# Patient Record
Sex: MALE | Race: WHITE | Employment: FULL TIME | URBAN - METROPOLITAN AREA
[De-identification: names, ages, dates, MRNs, and addresses within clinical notes are randomized per-mention and may not be internally consistent; named-entity substitution may affect disease eponyms.]

---

## 2017-12-04 PROCEDURE — 83970 ASSAY OF PARATHORMONE: CPT | Performed by: INTERNAL MEDICINE

## 2017-12-04 PROCEDURE — 81003 URINALYSIS AUTO W/O SCOPE: CPT | Performed by: INTERNAL MEDICINE

## 2020-02-21 ENCOUNTER — OFFICE VISIT (OUTPATIENT)
Dept: FAMILY MEDICINE CLINIC | Facility: CLINIC | Age: 60
End: 2020-02-21
Payer: COMMERCIAL

## 2020-02-21 VITALS
OXYGEN SATURATION: 99 % | HEIGHT: 75 IN | BODY MASS INDEX: 33.45 KG/M2 | DIASTOLIC BLOOD PRESSURE: 88 MMHG | RESPIRATION RATE: 12 BRPM | HEART RATE: 66 BPM | TEMPERATURE: 98 F | WEIGHT: 269 LBS | SYSTOLIC BLOOD PRESSURE: 124 MMHG

## 2020-02-21 DIAGNOSIS — H60.332 ACUTE SWIMMER'S EAR OF LEFT SIDE: ICD-10-CM

## 2020-02-21 DIAGNOSIS — H65.92 LEFT NON-SUPPURATIVE OTITIS MEDIA: Primary | ICD-10-CM

## 2020-02-21 PROCEDURE — 99203 OFFICE O/P NEW LOW 30 MIN: CPT | Performed by: PHYSICIAN ASSISTANT

## 2020-02-21 RX ORDER — AMOXICILLIN 500 MG/1
1000 TABLET, FILM COATED ORAL 2 TIMES DAILY
Qty: 40 TABLET | Refills: 0 | Status: SHIPPED | OUTPATIENT
Start: 2020-02-21 | End: 2020-03-02

## 2020-02-21 NOTE — PATIENT INSTRUCTIONS
Continue Flonase   Tylenol as needed   Keep ear dry   Please follow up with PCP if no improvement or if symptoms worsen

## 2020-02-21 NOTE — PROGRESS NOTES
CHIEF COMPLAINT:   Patient presents with:  Ear Pain      HPI:   Kimberli Gu is a 60year old male who presents to clinic today with complaints of left ear pain for 2 days. Pain to the touch. Pain radiating to the jaw. No ear discharge.  No hearing c 57.9 (all LAD)   • Hyperparathyroidism ([address] 1/16/2015   • Obesity    • Visual impairment     glasses      Social History:  Social History    Tobacco Use      Smoking status: Never Smoker      Smokeless tobacco: Never Used    Alcohol use:  Yes      Alcohol/ amoxicillin 500 MG Oral Tab 40 tablet 0     Sig: Take 2 tablets (1,000 mg total) by mouth 2 (two) times daily for 10 days.    • Neomycin-Polymyxin-HC 3.5-09043-7 Otic Solution 1 Bottle 0     Sig: Place 4 drops into the left ear 4 (four) times daily for 7 da

## 2020-12-14 PROBLEM — R93.1 AGATSTON CORONARY ARTERY CALCIUM SCORE LESS THAN 100: Status: ACTIVE | Noted: 2020-12-14

## 2021-03-26 ENCOUNTER — IMMUNIZATION (OUTPATIENT)
Dept: LAB | Age: 61
End: 2021-03-26

## 2021-03-26 DIAGNOSIS — Z23 NEED FOR VACCINATION: Primary | ICD-10-CM

## 2021-03-26 PROCEDURE — 91300 COVID 19 PFIZER-BIONTECH: CPT

## 2021-03-26 PROCEDURE — 0001A COVID 19 PFIZER-BIONTECH: CPT

## 2021-04-17 ENCOUNTER — IMMUNIZATION (OUTPATIENT)
Dept: LAB | Age: 61
End: 2021-04-17
Attending: HOSPITALIST

## 2021-04-17 DIAGNOSIS — Z23 NEED FOR VACCINATION: Primary | ICD-10-CM

## 2021-04-17 PROCEDURE — 0002A COVID 19 PFIZER-BIONTECH: CPT

## 2021-04-17 PROCEDURE — 91300 COVID 19 PFIZER-BIONTECH: CPT

## 2021-12-13 PROBLEM — Z90.49 HISTORY OF CHOLECYSTECTOMY: Status: ACTIVE | Noted: 2021-12-13
